# Patient Record
(demographics unavailable — no encounter records)

---

## 2025-02-18 NOTE — PHYSICAL EXAM
[Ankle Swelling (On Exam)] : present [Ankle Swelling On The Right] : of the right ankle [Varicose Veins Of Lower Extremities] : present [Varicose Veins Of The Right Leg] : on the right foot/ankle [FreeTextEntry3] : DP: 1/4, weak, bilaterally. PT: 1/4, weak, bilaterally. CFT: 4 seconds x10. [de-identified] : There is pain on the posterior aspect of the calf upon dorsiflexion of the ankle. 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 Negative pain on palpation of the area. Negative pain on attempted ROM of the 1st MPJ. Negative effusions. Negative signs of acute  bacterial infection. 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 Forefoot varus on the left that is fully compensated with medial collapse.  Muscle strength is 5/5 bilaterally. There is negative palpable dell noted of the Achilles tendon. [FreeTextEntry4] :   decreased vibratory at the 1st MPJ and medial malleoli  [FreeTextEntry8] :   decreased vibratory at the 1st MPJ and medial malleoli   [FreeTextEntry1] : [place] monofilament testing slightly diminished at the bilateral hallux, but intact with the remainder of the foot.

## 2025-02-18 NOTE — PROCEDURE
[FreeTextEntry1] : X-ray Report: X-rays were taken of the right ankle, DP, medial oblique and lateral, along with DP and medial oblique of the foot to R/O fracture. There is posterior and plantar spurring of the right calcaneus. There is calcified vesicles noted and visualized on exam along with some degenerative changes of the medial ankle but it is old in nature and doesn't appear to be acute subluxations or dislocations of the foot or ankle. Hammertoe deformity is visualized on th e2nd toe of the right foot and degenerative changes in the midfoot as well. Negative foreign body or gas in tissue.

## 2025-02-18 NOTE — HISTORY OF PRESENT ILLNESS
[FreeTextEntry1] : Patient presents today as an initial visit, his wife is present, for cutting of elongated nails. He is diabetic for several years. They state his finger sticks are controlled and his most recent hemoglobin A1c remains about 6 to 7. He sees his endocrinologist over at [facility]. He has seen Podiatrists in the past and he also sees Dr. Brendon Whitlock, his primary doctor. The patient's wife also states he is concerned regarding black and blue of his ankle and some swelling in his leg. He states approximately 1 1/2 weeks ago when walking he started developing pain in the area and it was difficult to walk. He states he has some pain with dorsiflexion of the ankle at the calf site. He does have varicose veins in that area. He denies any weakness. He denies any cramping in the leg when walking, just discomfort and pain when he dorsiflexes at the ankle joint. They go for walks daily and for periods of time due to his medical history.  He denies any acute trauma. She states that they notice black and they notice black and blue of the foot and ankle area as well over the past 2 days.

## 2025-02-18 NOTE — PHYSICAL EXAM
[Ankle Swelling (On Exam)] : present [Ankle Swelling On The Right] : of the right ankle [Varicose Veins Of Lower Extremities] : present [Varicose Veins Of The Right Leg] : on the right foot/ankle [FreeTextEntry3] : DP: 1/4, weak, bilaterally. PT: 1/4, weak, bilaterally. CFT: 4 seconds x10. [de-identified] : There is pain on the posterior aspect of the calf upon dorsiflexion of the ankle. 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 Negative pain on palpation of the area. Negative pain on attempted ROM of the 1st MPJ. Negative effusions. Negative signs of acute  bacterial infection. 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 Forefoot varus on the left that is fully compensated with medial collapse.  Muscle strength is 5/5 bilaterally. There is negative palpable dell noted of the Achilles tendon. [FreeTextEntry4] :   decreased vibratory at the 1st MPJ and medial malleoli  [FreeTextEntry8] :   decreased vibratory at the 1st MPJ and medial malleoli   [FreeTextEntry1] : [place] monofilament testing slightly diminished at the bilateral hallux, but intact with the remainder of the foot.

## 2025-02-18 NOTE — HISTORY OF PRESENT ILLNESS
[FreeTextEntry1] : Patient presents today as an initial visit, his wife is present, for cutting of elongated nails. He is diabetic for several years. They state his finger sticks are controlled and his most recent hemoglobin A1c remains about 6 to 7. He sees his endocrinologist over at [facility]. He has seen Podiatrists in the past and he also sees Dr. Brendon Whitlock, his primary doctor. The patient's wife also states he is concerned regarding black and blue of his ankle and some swelling in his leg. He states approximately 1 1/2 weeks ago when walking he started developing pain in the area and it was difficult to walk. He states he has some pain with dorsiflexion of the ankle at the calf site. He does have varicose veins in that area. He denies any weakness. He denies any cramping in the leg when walking, just discomfort and pain when he dorsiflexes at the ankle joint. They go for walks daily and for periods of time due to his medical history.  He denies any acute trauma. She states that they notice black and they notice black and blue of the foot and ankle area as well over the past 2 days.

## 2025-02-18 NOTE — ASSESSMENT
[FreeTextEntry1] : Impression: Diabetic with neurological manifestations. Onychomycosis. Edema right leg. R/O DVT vs. muscular gastroc soleus tear.  Treatment: Discussed findings and conditions with the patient. The patient's wife is also present for today's visit. They are to continue diabetic pedal care, avoid walking barefoot. Stay with comfortable shoes with increased height to avoid pressure to the 2nd digit of the right foot and the bunion area on the left foot to avoid any breakdown of skin or skin irritation resulting in a possible ulceration and infection.  All nails were prepped and mycotic nails x2 were manually debrided with sterile nippers to appropriate hygienic length. Height of nails were decreased and smoothed with a rotary tali.  Subungual debris was gently curettaged. Remainder of nails were trimmed to appropriate length. Patient was given a referral for a Doppler to R/O DVT of the right lower extremity, although low suspicion. Due to the edema of the area along with the pain would R/O DVT and also given a referral for ortho if the ultrasound is negative for ortho to evaluate for gastroc soleus or Baker's cyst, rupture or tear. He is to rest. He is to continue his current medications. He is to go to the hospital if there is any increase in swelling or pain in the leg or any other symptoms. Patient will be notified of the ultrasound results and return in approximately 2 to 2 1/2 months for follow-up for his nails. Will contact the office of any changes.